# Patient Record
Sex: MALE | Race: WHITE | NOT HISPANIC OR LATINO | Employment: UNEMPLOYED | URBAN - NONMETROPOLITAN AREA
[De-identification: names, ages, dates, MRNs, and addresses within clinical notes are randomized per-mention and may not be internally consistent; named-entity substitution may affect disease eponyms.]

---

## 2021-10-02 ENCOUNTER — HOSPITAL ENCOUNTER (EMERGENCY)
Facility: HOSPITAL | Age: 46
Discharge: HOME | End: 2021-10-03
Attending: EMERGENCY MEDICINE | Admitting: EMERGENCY MEDICINE

## 2021-10-02 VITALS
WEIGHT: 141 LBS | OXYGEN SATURATION: 95 % | HEIGHT: 78 IN | DIASTOLIC BLOOD PRESSURE: 77 MMHG | HEART RATE: 103 BPM | RESPIRATION RATE: 16 BRPM | BODY MASS INDEX: 16.31 KG/M2 | SYSTOLIC BLOOD PRESSURE: 126 MMHG | TEMPERATURE: 97.9 F

## 2021-10-02 DIAGNOSIS — L02.211 ABDOMINAL WALL ABSCESS: Primary | ICD-10-CM

## 2021-10-02 LAB
BASOPHILS # BLD AUTO: 0.04 10*3/MM3 (ref 0–0.2)
BASOPHILS NFR BLD AUTO: 0.5 % (ref 0–1.5)
DEPRECATED RDW RBC AUTO: 46.6 FL (ref 37–54)
EOSINOPHIL # BLD AUTO: 0.09 10*3/MM3 (ref 0–0.4)
EOSINOPHIL NFR BLD AUTO: 1.1 % (ref 0.3–6.2)
ERYTHROCYTE [DISTWIDTH] IN BLOOD BY AUTOMATED COUNT: 13.7 % (ref 12.3–15.4)
HCT VFR BLD AUTO: 29.1 % (ref 37.5–51)
HGB BLD-MCNC: 9.3 G/DL (ref 13–17.7)
IMM GRANULOCYTES # BLD AUTO: 0.03 10*3/MM3 (ref 0–0.05)
IMM GRANULOCYTES NFR BLD AUTO: 0.4 % (ref 0–0.5)
LYMPHOCYTES # BLD AUTO: 1.18 10*3/MM3 (ref 0.7–3.1)
LYMPHOCYTES NFR BLD AUTO: 14 % (ref 19.6–45.3)
MCH RBC QN AUTO: 29.3 PG (ref 26.6–33)
MCHC RBC AUTO-ENTMCNC: 32 G/DL (ref 31.5–35.7)
MCV RBC AUTO: 91.8 FL (ref 79–97)
MONOCYTES # BLD AUTO: 1.12 10*3/MM3 (ref 0.1–0.9)
MONOCYTES NFR BLD AUTO: 13.3 % (ref 5–12)
NEUTROPHILS NFR BLD AUTO: 5.97 10*3/MM3 (ref 1.7–7)
NEUTROPHILS NFR BLD AUTO: 70.7 % (ref 42.7–76)
NRBC BLD AUTO-RTO: 0 /100 WBC (ref 0–0.2)
PLATELET # BLD AUTO: 535 10*3/MM3 (ref 140–450)
PMV BLD AUTO: 9.1 FL (ref 6–12)
RBC # BLD AUTO: 3.17 10*6/MM3 (ref 4.14–5.8)
WBC # BLD AUTO: 8.43 10*3/MM3 (ref 3.4–10.8)

## 2021-10-02 PROCEDURE — 86618 LYME DISEASE ANTIBODY: CPT | Performed by: PHYSICIAN ASSISTANT

## 2021-10-02 PROCEDURE — 86666 EHRLICHIA ANTIBODY: CPT | Performed by: PHYSICIAN ASSISTANT

## 2021-10-02 PROCEDURE — 86753 PROTOZOA ANTIBODY NOS: CPT | Performed by: PHYSICIAN ASSISTANT

## 2021-10-02 PROCEDURE — 80053 COMPREHEN METABOLIC PANEL: CPT | Performed by: PHYSICIAN ASSISTANT

## 2021-10-02 PROCEDURE — 99283 EMERGENCY DEPT VISIT LOW MDM: CPT

## 2021-10-02 PROCEDURE — 86757 RICKETTSIA ANTIBODY: CPT | Performed by: PHYSICIAN ASSISTANT

## 2021-10-02 PROCEDURE — 85025 COMPLETE CBC W/AUTO DIFF WBC: CPT | Performed by: PHYSICIAN ASSISTANT

## 2021-10-02 RX ORDER — BUPRENORPHINE HYDROCHLORIDE AND NALOXONE HYDROCHLORIDE DIHYDRATE 8; 2 MG/1; MG/1
1 TABLET SUBLINGUAL DAILY
COMMUNITY

## 2021-10-02 RX ORDER — LIDOCAINE HYDROCHLORIDE 10 MG/ML
10 INJECTION, SOLUTION INFILTRATION; PERINEURAL ONCE
Status: COMPLETED | OUTPATIENT
Start: 2021-10-02 | End: 2021-10-03

## 2021-10-02 RX ORDER — LISINOPRIL 20 MG/1
20 TABLET ORAL DAILY
COMMUNITY

## 2021-10-03 LAB
ALBUMIN SERPL-MCNC: 3.5 G/DL (ref 3.5–5.2)
ALBUMIN/GLOB SERPL: 0.9 G/DL
ALP SERPL-CCNC: 80 U/L (ref 39–117)
ALT SERPL W P-5'-P-CCNC: 11 U/L (ref 1–41)
ANION GAP SERPL CALCULATED.3IONS-SCNC: 10.8 MMOL/L (ref 5–15)
AST SERPL-CCNC: 18 U/L (ref 1–40)
BILIRUB SERPL-MCNC: 0.2 MG/DL (ref 0–1.2)
BUN SERPL-MCNC: 19 MG/DL (ref 6–20)
BUN/CREAT SERPL: 18.3 (ref 7–25)
CALCIUM SPEC-SCNC: 9.5 MG/DL (ref 8.6–10.5)
CHLORIDE SERPL-SCNC: 100 MMOL/L (ref 98–107)
CO2 SERPL-SCNC: 23.2 MMOL/L (ref 22–29)
CREAT SERPL-MCNC: 1.04 MG/DL (ref 0.76–1.27)
GFR SERPL CREATININE-BSD FRML MDRD: 77 ML/MIN/1.73
GLOBULIN UR ELPH-MCNC: 4 GM/DL
GLUCOSE SERPL-MCNC: 101 MG/DL (ref 65–99)
POTASSIUM SERPL-SCNC: 4.2 MMOL/L (ref 3.5–5.2)
PROT SERPL-MCNC: 7.5 G/DL (ref 6–8.5)
SODIUM SERPL-SCNC: 134 MMOL/L (ref 136–145)

## 2021-10-03 PROCEDURE — 87070 CULTURE OTHR SPECIMN AEROBIC: CPT | Performed by: PHYSICIAN ASSISTANT

## 2021-10-03 PROCEDURE — 25010000003 LIDOCAINE 1 % SOLUTION: Performed by: PHYSICIAN ASSISTANT

## 2021-10-03 PROCEDURE — 87186 SC STD MICRODIL/AGAR DIL: CPT | Performed by: PHYSICIAN ASSISTANT

## 2021-10-03 PROCEDURE — 87147 CULTURE TYPE IMMUNOLOGIC: CPT | Performed by: PHYSICIAN ASSISTANT

## 2021-10-03 PROCEDURE — 87205 SMEAR GRAM STAIN: CPT | Performed by: PHYSICIAN ASSISTANT

## 2021-10-03 RX ADMIN — LIDOCAINE HYDROCHLORIDE 10 ML: 10 INJECTION, SOLUTION INFILTRATION; PERINEURAL at 00:41

## 2021-10-03 NOTE — DISCHARGE INSTRUCTIONS
We will call you with the results of your blood test and a wound culture from your abscesses if another antibiotic is required.  Please follow-up with the surgeon that you were told to in your hometown.  We have also supplied you with the name and number of our local surgeon here who can see you as well.

## 2021-10-03 NOTE — ED PROVIDER NOTES
Subjective   Chief Complaint: Abdominal wall abscesses, fatigue, large area of swelling to left posterior lateral ribs  History of Present Illness: 6-year-old male presents for evaluation of above complaints.  He states he is been on numerous different antibiotics for abscesses to his abdominal wall.  He states he had a CT scan and blood work done at an outlying facility showing a cystic mass to his left wrist or lateral ribs.  He was told to follow-up with general surgery.  Expresses concern because 2 months ago he found 2 ticks on him and is concerned he is developed tickborne illness.  His vitals are stable here.  Onset: 2 months ago  Timing: Ongoing  Exacerbating / Alleviating factors: None  Associated symptoms: None      Nurses Notes reviewed and agree, including vitals, allergies, social history and prior medical history.          Review of Systems   Constitutional: Positive for fatigue.        Subjective fevers   HENT: Negative.    Eyes: Negative.    Respiratory: Negative.    Cardiovascular: Negative.    Gastrointestinal: Negative.    Genitourinary: Negative.    Musculoskeletal:        Joint pain   Skin:        Abscesses to the abdominal wall, cystic mass left posterior lateral ribs   Allergic/Immunologic: Negative.    Neurological: Negative.    Psychiatric/Behavioral: Negative.    All other systems reviewed and are negative.      Past Medical History:   Diagnosis Date   • Hypertension    • PTSD (post-traumatic stress disorder)        No Known Allergies    Past Surgical History:   Procedure Laterality Date   • AMPUTATION     • BACK SURGERY     • KNEE ACL RECONSTRUCTION         History reviewed. No pertinent family history.    Social History     Socioeconomic History   • Marital status: Single     Spouse name: Not on file   • Number of children: Not on file   • Years of education: Not on file   • Highest education level: Not on file   Tobacco Use   • Smoking status: Current Every Day Smoker     Packs/day: 0.00      Types: Cigarettes   • Tobacco comment: 3 cigarretes a day   Substance and Sexual Activity   • Alcohol use: Not Currently   • Drug use: Not Currently           Objective   Physical Exam  Vitals and nursing note reviewed.   Constitutional:       General: He is not in acute distress.     Appearance: Normal appearance. He is not ill-appearing or toxic-appearing.   HENT:      Head: Normocephalic and atraumatic.      Nose: Nose normal.   Eyes:      Extraocular Movements: Extraocular movements intact.   Cardiovascular:      Rate and Rhythm: Normal rate and regular rhythm.   Pulmonary:      Effort: Pulmonary effort is normal.      Breath sounds: Normal breath sounds.   Abdominal:      General: Abdomen is flat.      Palpations: Abdomen is soft.   Musculoskeletal:         General: Normal range of motion.      Cervical back: Normal range of motion.   Skin:     Comments: 2 large abscesses to the right lower abdomen, fluctuant.  No significant overlying cellulitis.    Large cystic appearing mass of the left posterior lateral ribs.  This is about the size of a palm and outstretched hands.,  There is no overlying cellulitis or fluctuance or open wounds   Neurological:      General: No focal deficit present.      Mental Status: He is alert. Mental status is at baseline.   Psychiatric:         Behavior: Behavior normal.         Procedures  Incision and Drainage Procedure1    Incision and Drainage of abscess located to patients right abdomen  size 6 cm x 4 cm  preparation with alcohol wipe  anesthesia achieved with 1% lidocaine without epinephrine  incision with 11 blade scalpel  drained (amount and quality) obese amount of purulent drainage, 2.5 inches of quarter inch gauze packing placed  probed and deloculated (complicated abscess)  dressing: Gauze  patient tolerated procedure well     Incision and Drainage Procedure2    Incision and Drainage of abscess located to patients right lower abdomen  size centimeters by 4  cm  preparation with alcohol wipe  anesthesia achieved with 1% lidocaine without epinephrine  incision with 11 blade scalpel  drained (amount and quality) copious amount of purulent discharge  probed and deloculated (complicated abscess)  dressing: Gauze  patient tolerated procedure well    ED Course      Review of records from [facility] in [facility] showed the patient was seen and evaluated on 9/15/2021.  He had soft tissue abscesses to the abdominal wall and one on his chest anteriorly.  There is a large soft tissue mass noted to the left lower ribs posteriorly as well.  CT scan was obtained that showed the soft tissue mass in the upper back which was most likely cystic and also a soft tissue mass which appeared to be solid along the right rectus muscle.  He was discharged home with Bactrim DS twice a day and given Relafen 500 twice daily for 10 days.  Instructed to follow-up with a local surgeon for outpatient surgery.  Blood work was notable for leukocytosis of 14.79, he was discharged home in good stable condition.  He is yet to follow-up with surgery.  Here to abdominal wall abscesses were incised and drained with successful removal of copious amounts of purulent drainage.  Serologies were obtained for tickborne illnesses.  Culture was obtained for the abscesses to the abdominal wall and will call the patient with antibiotic recommendations while susceptibility testing has returned.  Patient is in no acute distress nontoxic I do feel that the soft tissue mass/swelling to the left posterior lateral ribs will be consistent with a cystic mass not amenable to I&D here in the ER given its size.  He will need outpatient follow-up.                                     MDM    Final diagnoses:   Abdominal wall abscess       ED Disposition  ED Disposition     ED Disposition Condition Comment    Discharge Stable           Candelaria Hoffman MD  [address] [address] [phone]    Schedule an  appointment as soon as possible for a visit            Medication List      No changes were made to your prescriptions during this visit.          Jovani Gtz PA-C  10/03/21 0055       Jovani Gtz PA-C  10/03/21 0115

## 2021-10-04 ENCOUNTER — OFFICE VISIT (OUTPATIENT)
Dept: SURGERY | Facility: CLINIC | Age: 46
End: 2021-10-04

## 2021-10-04 VITALS
OXYGEN SATURATION: 97 % | TEMPERATURE: 98.4 F | BODY MASS INDEX: 16.59 KG/M2 | SYSTOLIC BLOOD PRESSURE: 128 MMHG | DIASTOLIC BLOOD PRESSURE: 78 MMHG | HEIGHT: 78 IN | WEIGHT: 143.4 LBS | HEART RATE: 107 BPM

## 2021-10-04 DIAGNOSIS — L02.212 CUTANEOUS ABSCESS OF BACK EXCLUDING BUTTOCKS: ICD-10-CM

## 2021-10-04 DIAGNOSIS — R22.2 MASS ON BACK: Primary | ICD-10-CM

## 2021-10-04 LAB
B BURGDOR IGG+IGM SER-ACNC: <0.91 ISR (ref 0–0.9)
B BURGDOR IGM SER IA-ACNC: <0.8 INDEX (ref 0–0.79)

## 2021-10-04 PROCEDURE — 10061 I&D ABSCESS COMP/MULTIPLE: CPT | Performed by: SURGERY

## 2021-10-04 PROCEDURE — 99203 OFFICE O/P NEW LOW 30 MIN: CPT | Performed by: SURGERY

## 2021-10-04 NOTE — PROGRESS NOTES
Patient: Chuy Mohr    YOB: 1975    Date: 10/04/2021    Primary Care Provider: Provider, No Known    Reason for Consultation: Lesion    Chief Complaint   Patient presents with   • Cyst     chest and back       Subjective .     History of present illness:  I saw the patient in the office today as a consultation for a cyst on the back and he had an I&D on the abdomen in the ER on 10/2/2021.  Patient has a very large mass on his left middle back that is greater than 20 x 30 cm.  It is fluctuant and very tender and has been present for over 2 months.  Also indicates there is fluid in the knee area consistent with an abscess.    The following portions of the patient's history were reviewed and updated as appropriate: allergies, current medications, past family history, past medical history, past social history, past surgical history and problem list.    Review of Systems   Constitutional: Negative for chills, fever and unexpected weight change.   HENT: Negative for trouble swallowing and voice change.    Eyes: Negative for visual disturbance.   Respiratory: Negative for apnea, cough, chest tightness, shortness of breath and wheezing.    Cardiovascular: Negative for chest pain, palpitations and leg swelling.   Gastrointestinal: Negative for abdominal distention, abdominal pain, anal bleeding, blood in stool, constipation, diarrhea, nausea, rectal pain and vomiting.   Endocrine: Negative for cold intolerance and heat intolerance.   Genitourinary: Negative for difficulty urinating, dysuria, flank pain, scrotal swelling and testicular pain.   Musculoskeletal: Negative for back pain, gait problem and joint swelling.   Skin: Negative for color change, rash and wound.   Neurological: Negative for dizziness, syncope, speech difficulty, weakness, numbness and headaches.   Hematological: Negative for adenopathy. Does not bruise/bleed easily.   Psychiatric/Behavioral: Negative for confusion. The patient is not  "nervous/anxious.        History:  Past Medical History:   Diagnosis Date   • Diabetes mellitus (HCC)    • Hypertension    • PTSD (post-traumatic stress disorder)        Past Surgical History:   Procedure Laterality Date   • AMPUTATION     • BACK SURGERY     • KNEE ACL RECONSTRUCTION         Family History   Problem Relation Age of Onset   • Cancer Mother    • Hypertension Mother    • Heart attack Father    • Hypertension Father    • Diabetes Sister    • Hypertension Sister        Social History     Tobacco Use   • Smoking status: Current Every Day Smoker     Packs/day: 0.00     Types: Cigarettes   • Smokeless tobacco: Current User     Types: Snuff   • Tobacco comment: 3 cigarretes a day   Substance Use Topics   • Alcohol use: Not Currently   • Drug use: Not Currently        Allergies:  No Known Allergies    Medications:    Current Outpatient Medications:   •  buprenorphine-naloxone (SUBOXONE) 8-2 MG per SL tablet, Place 1 tablet under the tongue Daily., Disp: , Rfl:   •  lisinopril (PRINIVIL,ZESTRIL) 20 MG tablet, Take 20 mg by mouth Daily., Disp: , Rfl:     Objective     Vital Signs:   Vitals:    10/04/21 1348   BP: 128/78   Pulse: 107   Temp: 98.4 °F (36.9 °C)   TempSrc: Temporal   SpO2: 97%   Weight: 65 kg (143 lb 6.4 oz)   Height: 198.1 cm (78\")       Physical Exam:  Lungs:     Clear to auscultation,respirations regular, even and                  unlabored    Heart:    Regular rhythm and normal rate, normal S1 and S2, no            murmur      General Appearance:    Alert, cooperative, in no acute distress   Head:    Normocephalic, without obvious abnormality, atraumatic   Eyes:            Lids and lashes normal, conjunctivae and sclerae normal, no   icterus, no pallor, corneas clear.   Ears:    Ears appear intact with no abnormalities noted   Throat:   No oral lesions, no thrush, oral mucosa moist   Neck:   No adenopathy, supple, trachea midline, no thyromegaly, no   carotid bruit.   Extremities:   Moves all " extremities well, no edema, no cyanosis, no             Redness.    Pulses:   Pulses palpable and equal bilaterally   Skin:   No bleeding, bruising or rash. Lesion left middle back, greater than 30 cm.  Fluctuant very tender.   Lymph nodes:   No palpable adenopathy   Neurologic:   Cranial nerves 2 - 12 grossly intact, sensation intact.     Results Review:   I reviewed the patient's new clinical results.    Review of Systems was reviewed and confirmed as accurate as documented by the MA.    Assessment/Plan     1. Mass on back    2. Cutaneous abscess of back excluding buttocks        Patient requires incision drainage of large abscess on his back.    Procedure: Incision and drainage complex abscess left lower mid back with packing    I recommended abscess drainage to the patient. I explained the indication as well as the risks and benefits which include bleeding, further infection requiring additional procedures, non healing of the wound etc. The patient understands these and wishes to proceed.      The patient was brought to the procedure room. Consent and time out were performed. The area was prepped and draped in the usual fashion. 1% lidocaine with epinephrine was infused locally. The abscess was then incised and drained sharply with a #11 blade. Purulent contents were evacuated and irrigated with saline and peroxide.  There was at least 32 ounces of purulent fluid removed.  Packing placed.  Minimal blood loss had occurred and was well controlled with pressure. There were no complications and the patient tolerated the procedure well. Wound instructions were given.     I discussed the patients findings and my recommendations with patient and consulting provider.    Electronically signed by Candelaria Hoffman MD  10/04/21  15:31 EDT

## 2021-10-05 ENCOUNTER — TELEPHONE (OUTPATIENT)
Dept: SURGERY | Facility: CLINIC | Age: 46
End: 2021-10-05

## 2021-10-05 ENCOUNTER — TELEPHONE (OUTPATIENT)
Dept: EMERGENCY DEPT | Facility: HOSPITAL | Age: 46
End: 2021-10-05

## 2021-10-05 LAB
A PHAGOCYTOPH IGG TITR SER IF: NEGATIVE {TITER}
A PHAGOCYTOPH IGM TITR SER IF: NEGATIVE {TITER}
BABESIA IGG TITR SER IF: NORMAL {TITER}
BABESIA IGM TITR SER IF: NORMAL {TITER}
BACTERIA SPEC AEROBE CULT: ABNORMAL
E CHAFFEENSIS IGG TITR SER IF: NEGATIVE {TITER}
E CHAFFEENSIS IGM TITR SER IF: NEGATIVE {TITER}
GRAM STN SPEC: ABNORMAL
GRAM STN SPEC: ABNORMAL
R RICKETTSI IGG SER QL IA: NEGATIVE
R RICKETTSI IGM SER-ACNC: 0.2 INDEX (ref 0–0.89)

## 2021-10-05 RX ORDER — TRAMADOL HYDROCHLORIDE 50 MG/1
50 TABLET ORAL EVERY 6 HOURS PRN
Qty: 20 TABLET | Refills: 0 | Status: SHIPPED | OUTPATIENT
Start: 2021-10-05 | End: 2022-10-05

## 2021-10-06 ENCOUNTER — OFFICE VISIT (OUTPATIENT)
Dept: SURGERY | Facility: CLINIC | Age: 46
End: 2021-10-06

## 2021-10-06 VITALS
WEIGHT: 143.3 LBS | HEART RATE: 117 BPM | HEIGHT: 78 IN | DIASTOLIC BLOOD PRESSURE: 70 MMHG | TEMPERATURE: 98.7 F | BODY MASS INDEX: 16.58 KG/M2 | SYSTOLIC BLOOD PRESSURE: 124 MMHG | OXYGEN SATURATION: 94 %

## 2021-10-06 DIAGNOSIS — Z48.89 POSTOPERATIVE VISIT: Primary | ICD-10-CM

## 2021-10-06 DIAGNOSIS — L02.212 CUTANEOUS ABSCESS OF BACK EXCLUDING BUTTOCKS: ICD-10-CM

## 2021-10-06 PROCEDURE — 10061 I&D ABSCESS COMP/MULTIPLE: CPT | Performed by: SURGERY

## 2021-10-06 PROCEDURE — 99024 POSTOP FOLLOW-UP VISIT: CPT | Performed by: SURGERY

## 2021-10-06 RX ORDER — DOXYCYCLINE 100 MG/1
100 CAPSULE ORAL 2 TIMES DAILY
Qty: 14 CAPSULE | Refills: 0 | Status: SHIPPED | OUTPATIENT
Start: 2021-10-06 | End: 2021-10-13

## 2021-10-06 NOTE — PROGRESS NOTES
"Patient: Chuy Mohr  YOB: 1975    Date: 10/06/2021    Primary Care Provider: Provider, No Known    Chief Complaint   Patient presents with   • Post-op Follow-up     left side abscess       History: The patient is in the office today for a follow up after I&D of a large abscess on his left side.  He complains of pain and fever.  Patient has persistent drainage of abscess in the left upper back.    The following portions of the patient's history were reviewed and updated as appropriate: allergies, current medications, past family history, past medical history, past social history, past surgical history and problem list.    Review of Systems   Constitutional: Negative for chills, fever and unexpected weight change.   HENT: Negative for trouble swallowing and voice change.    Eyes: Negative for visual disturbance.   Respiratory: Negative for apnea, cough, chest tightness, shortness of breath and wheezing.    Cardiovascular: Negative for chest pain, palpitations and leg swelling.   Gastrointestinal: Negative for abdominal distention, abdominal pain, anal bleeding, blood in stool, constipation, diarrhea, nausea, rectal pain and vomiting.   Endocrine: Negative for cold intolerance and heat intolerance.   Genitourinary: Negative for difficulty urinating, dysuria, flank pain, scrotal swelling and testicular pain.   Musculoskeletal: Negative for back pain, gait problem and joint swelling.   Skin: Negative for color change, rash and wound.   Neurological: Negative for dizziness, syncope, speech difficulty, weakness, numbness and headaches.   Hematological: Negative for adenopathy. Does not bruise/bleed easily.   Psychiatric/Behavioral: Negative for confusion. The patient is not nervous/anxious.        Vital Signs  Vitals:    10/06/21 0946   BP: 124/70   Pulse: 117   Temp: 98.7 °F (37.1 °C)   TempSrc: Temporal   SpO2: 94%   Weight: 65 kg (143 lb 4.8 oz)   Height: 198.1 cm (77.99\")       Allergies:  No Known " Allergies    Medications:    Current Outpatient Medications:   •  buprenorphine-naloxone (SUBOXONE) 8-2 MG per SL tablet, Place 1 tablet under the tongue Daily., Disp: , Rfl:   •  lisinopril (PRINIVIL,ZESTRIL) 20 MG tablet, Take 20 mg by mouth Daily., Disp: , Rfl:   •  traMADol (Ultram) 50 MG tablet, Take 1 tablet by mouth Every 6 (Six) Hours As Needed for Severe Pain ., Disp: 20 tablet, Rfl: 0    Physical Exam:   General Appearance:    Alert, cooperative, in no acute distress   Head:    Normocephalic, without obvious abnormality, atraumatic   Lungs:     Clear to auscultation,respirations regular, even and                  unlabored    Heart:    Regular rhythm and normal rate, normal S1 and S2, no            murmur, no gallop, no rub, no click   Abdomen:     Normal bowel sounds, no masses, no organomegaly, soft        non-tender, non-distended, no guarding, no rebound                tenderness   Extremities:   Moves all extremities well, no edema, no cyanosis, no             redness   Pulses:   Pulses palpable and equal bilaterally   Skin:   No bleeding, bruising or rash.  Persistent absent left upper quadrant.     Results Review:   I reviewed the patient's new clinical results.     Review of Systems was reviewed and confirmed as accurate as documented by the MA.    ASSESSMENT/PLAN:    1. Postoperative visit    2. Cutaneous abscess of back excluding buttocks       Procedure: Incision and drainage complex deep abscess left upper back findings    I recommended abscess drainage to the patient. I explained the indication as well as the risks and benefits which include bleeding, further infection requiring additional procedures, non healing of the wound etc. The patient understands these and wishes to proceed.      The patient was brought to the procedure room. Consent and time out were performed. The area was prepped and draped in the usual fashion. 1% lidocaine with epinephrine was infused locally. The abscess was then  incised and drained sharply with a #11 blade. Purulent contents were evacuated and irrigated with saline and peroxide. Minimal blood loss had occurred and was well controlled with pressure. There were no complications and the patient tolerated the procedure well. Wound instructions were given.    Electronically signed by Candelaria Hoffman MD  10/06/21    Portions of this note have been scribed for Candelaria Hoffman MD by Michelle Milligan. 10/6/2021  10:04 EDT

## 2021-10-12 NOTE — PROGRESS NOTES
"Patient: Chuy Mohr    YOB: 1975    Date: 10/13/2021    Primary Care Provider: Provider, No Known    Chief Complaint   Patient presents with   • Follow-up     I & D       History of present illness:  I saw the patient in the office today as a followup from their recent incision and drainage of a left side abscess.  They state that they have done well and are having no problems.    Vital Signs:  Vitals:    10/13/21 1011   BP: 118/64   Pulse: 97   Resp: 18   Temp: 98.2 °F (36.8 °C)   TempSrc: Temporal   SpO2: 100%   Weight: 64 kg (141 lb 3.2 oz)   Height: 198.1 cm (77.99\")       Physical Exam:   General Appearance:    Alert, cooperative, in no acute distress, wound clean dry without infection   Abdomen:     no masses, no organomegaly, soft non-tender, non-distended, no guarding, wounds are well healed   Chest:      Clear to ausculation       Assessment / Plan:    1. Postoperative visit        I did discuss the situation with the patient today in the office and they have done well from their recent lesion excision, I don't think that the patient needs any further intervention and I need to see them back only if they have further problems. Pathology report was reviewed with the patient in the office.  RAY drain removed.  Follow-up as needed.    Electronically signed by Candelaria Hoffman MD  10/13/21                      "

## 2021-10-13 ENCOUNTER — OFFICE VISIT (OUTPATIENT)
Dept: SURGERY | Facility: CLINIC | Age: 46
End: 2021-10-13

## 2021-10-13 VITALS
HEART RATE: 97 BPM | OXYGEN SATURATION: 100 % | HEIGHT: 78 IN | DIASTOLIC BLOOD PRESSURE: 64 MMHG | BODY MASS INDEX: 16.34 KG/M2 | RESPIRATION RATE: 18 BRPM | SYSTOLIC BLOOD PRESSURE: 118 MMHG | WEIGHT: 141.2 LBS | TEMPERATURE: 98.2 F

## 2021-10-13 DIAGNOSIS — Z48.89 POSTOPERATIVE VISIT: Primary | ICD-10-CM

## 2021-10-13 DIAGNOSIS — L02.212 CUTANEOUS ABSCESS OF BACK EXCLUDING BUTTOCKS: Primary | ICD-10-CM

## 2021-10-13 PROCEDURE — 99024 POSTOP FOLLOW-UP VISIT: CPT | Performed by: SURGERY

## 2024-04-12 ENCOUNTER — PRE-ADMISSION TESTING (OUTPATIENT)
Dept: PREADMISSION TESTING | Facility: HOSPITAL | Age: 49
End: 2024-04-12
Payer: MEDICARE

## 2024-04-12 LAB
ANION GAP SERPL CALCULATED.3IONS-SCNC: 10.8 MMOL/L (ref 5–15)
BUN SERPL-MCNC: 15 MG/DL (ref 6–20)
BUN/CREAT SERPL: 22.7 (ref 7–25)
CALCIUM SPEC-SCNC: 9.5 MG/DL (ref 8.6–10.5)
CHLORIDE SERPL-SCNC: 100 MMOL/L (ref 98–107)
CO2 SERPL-SCNC: 28.2 MMOL/L (ref 22–29)
CREAT SERPL-MCNC: 0.66 MG/DL (ref 0.76–1.27)
DEPRECATED RDW RBC AUTO: 50.9 FL (ref 37–54)
EGFRCR SERPLBLD CKD-EPI 2021: 115 ML/MIN/1.73
ERYTHROCYTE [DISTWIDTH] IN BLOOD BY AUTOMATED COUNT: 14 % (ref 12.3–15.4)
GLUCOSE SERPL-MCNC: 98 MG/DL (ref 65–99)
HCT VFR BLD AUTO: 40.2 % (ref 37.5–51)
HGB BLD-MCNC: 12.9 G/DL (ref 13–17.7)
MCH RBC QN AUTO: 31.6 PG (ref 26.6–33)
MCHC RBC AUTO-ENTMCNC: 32.1 G/DL (ref 31.5–35.7)
MCV RBC AUTO: 98.5 FL (ref 79–97)
PLATELET # BLD AUTO: 354 10*3/MM3 (ref 140–450)
PMV BLD AUTO: 10.3 FL (ref 6–12)
POTASSIUM SERPL-SCNC: 4 MMOL/L (ref 3.5–5.2)
RBC # BLD AUTO: 4.08 10*6/MM3 (ref 4.14–5.8)
SODIUM SERPL-SCNC: 139 MMOL/L (ref 136–145)
WBC NRBC COR # BLD AUTO: 10.82 10*3/MM3 (ref 3.4–10.8)

## 2024-04-12 PROCEDURE — 85027 COMPLETE CBC AUTOMATED: CPT

## 2024-04-12 PROCEDURE — 36415 COLL VENOUS BLD VENIPUNCTURE: CPT

## 2024-04-12 PROCEDURE — 80048 BASIC METABOLIC PNL TOTAL CA: CPT

## 2024-04-12 RX ORDER — GABAPENTIN 300 MG/1
300 CAPSULE ORAL 3 TIMES DAILY
COMMUNITY

## 2024-04-12 RX ORDER — QUETIAPINE 400 MG/1
400 TABLET, FILM COATED, EXTENDED RELEASE ORAL NIGHTLY
COMMUNITY

## 2024-04-12 RX ORDER — INDOMETHACIN 75 MG/1
75 CAPSULE, EXTENDED RELEASE ORAL 2 TIMES DAILY WITH MEALS
COMMUNITY

## 2024-04-12 RX ORDER — TIZANIDINE 4 MG/1
4 TABLET ORAL EVERY 8 HOURS PRN
COMMUNITY

## 2024-04-12 RX ORDER — PRAVASTATIN SODIUM 10 MG
10 TABLET ORAL NIGHTLY
COMMUNITY

## 2024-04-12 RX ORDER — CEPHALEXIN 250 MG/1
250 CAPSULE ORAL 2 TIMES DAILY
COMMUNITY

## 2024-04-12 NOTE — DISCHARGE INSTRUCTIONS

## 2024-04-15 ENCOUNTER — APPOINTMENT (OUTPATIENT)
Dept: GENERAL RADIOLOGY | Facility: HOSPITAL | Age: 49
End: 2024-04-15
Payer: MEDICARE

## 2024-04-15 ENCOUNTER — ANESTHESIA (OUTPATIENT)
Dept: PERIOP | Facility: HOSPITAL | Age: 49
End: 2024-04-15
Payer: MEDICARE

## 2024-04-15 ENCOUNTER — HOSPITAL ENCOUNTER (OUTPATIENT)
Facility: HOSPITAL | Age: 49
Setting detail: HOSPITAL OUTPATIENT SURGERY
Discharge: HOME | End: 2024-04-15
Attending: GENERAL PRACTICE | Admitting: GENERAL PRACTICE
Payer: MEDICARE

## 2024-04-15 ENCOUNTER — ANESTHESIA EVENT (OUTPATIENT)
Dept: PERIOP | Facility: HOSPITAL | Age: 49
End: 2024-04-15
Payer: MEDICARE

## 2024-04-15 VITALS
SYSTOLIC BLOOD PRESSURE: 135 MMHG | OXYGEN SATURATION: 98 % | DIASTOLIC BLOOD PRESSURE: 97 MMHG | WEIGHT: 172 LBS | BODY MASS INDEX: 19.9 KG/M2 | HEART RATE: 68 BPM | HEIGHT: 78 IN | TEMPERATURE: 97.9 F | RESPIRATION RATE: 16 BRPM

## 2024-04-15 DIAGNOSIS — Z98.890 H/O KNEE SURGERY: Primary | ICD-10-CM

## 2024-04-15 PROCEDURE — 25010000002 PROPOFOL 200 MG/20ML EMULSION: Performed by: NURSE ANESTHETIST, CERTIFIED REGISTERED

## 2024-04-15 PROCEDURE — 25010000002 FENTANYL CITRATE (PF) 50 MCG/ML SOLUTION: Performed by: NURSE ANESTHETIST, CERTIFIED REGISTERED

## 2024-04-15 PROCEDURE — 25010000002 KETOROLAC TROMETHAMINE PER 15 MG: Performed by: NURSE ANESTHETIST, CERTIFIED REGISTERED

## 2024-04-15 PROCEDURE — 25010000002 CEFAZOLIN PER 500 MG: Performed by: GENERAL PRACTICE

## 2024-04-15 PROCEDURE — 25010000002 BUPIVACAINE (PF) 0.25 % SOLUTION 10 ML VIAL: Performed by: GENERAL PRACTICE

## 2024-04-15 PROCEDURE — 25810000003 LACTATED RINGERS PER 1000 ML: Performed by: NURSE ANESTHETIST, CERTIFIED REGISTERED

## 2024-04-15 PROCEDURE — 25810000003 LACTATED RINGERS PER 1000 ML: Performed by: ANESTHESIOLOGY

## 2024-04-15 PROCEDURE — 25010000002 MIDAZOLAM PER 1 MG: Performed by: NURSE ANESTHETIST, CERTIFIED REGISTERED

## 2024-04-15 PROCEDURE — 25010000002 ONDANSETRON PER 1 MG: Performed by: NURSE ANESTHETIST, CERTIFIED REGISTERED

## 2024-04-15 PROCEDURE — 25010000002 LIDOCAINE 1 % SOLUTION 20 ML VIAL: Performed by: GENERAL PRACTICE

## 2024-04-15 PROCEDURE — 25010000002 MEPERIDINE PER 100 MG: Performed by: NURSE ANESTHETIST, CERTIFIED REGISTERED

## 2024-04-15 RX ORDER — OXYCODONE HYDROCHLORIDE 5 MG/1
5 TABLET ORAL EVERY 6 HOURS PRN
Qty: 30 TABLET | Refills: 0 | Status: SHIPPED | OUTPATIENT
Start: 2024-04-15

## 2024-04-15 RX ORDER — MEPERIDINE HYDROCHLORIDE 25 MG/ML
12.5 INJECTION INTRAMUSCULAR; INTRAVENOUS; SUBCUTANEOUS
Status: COMPLETED | OUTPATIENT
Start: 2024-04-15 | End: 2024-04-15

## 2024-04-15 RX ORDER — MIDAZOLAM HYDROCHLORIDE 1 MG/ML
1 INJECTION INTRAMUSCULAR; INTRAVENOUS
Status: DISCONTINUED | OUTPATIENT
Start: 2024-04-15 | End: 2024-04-15 | Stop reason: HOSPADM

## 2024-04-15 RX ORDER — ONDANSETRON 2 MG/ML
4 INJECTION INTRAMUSCULAR; INTRAVENOUS AS NEEDED
Status: DISCONTINUED | OUTPATIENT
Start: 2024-04-15 | End: 2024-04-15 | Stop reason: HOSPADM

## 2024-04-15 RX ORDER — SODIUM CHLORIDE 0.9 % (FLUSH) 0.9 %
10 SYRINGE (ML) INJECTION EVERY 12 HOURS SCHEDULED
Status: DISCONTINUED | OUTPATIENT
Start: 2024-04-15 | End: 2024-04-15 | Stop reason: HOSPADM

## 2024-04-15 RX ORDER — SODIUM CHLORIDE, SODIUM LACTATE, POTASSIUM CHLORIDE, CALCIUM CHLORIDE 600; 310; 30; 20 MG/100ML; MG/100ML; MG/100ML; MG/100ML
100 INJECTION, SOLUTION INTRAVENOUS ONCE AS NEEDED
Status: DISCONTINUED | OUTPATIENT
Start: 2024-04-15 | End: 2024-04-15 | Stop reason: HOSPADM

## 2024-04-15 RX ORDER — OXYCODONE HYDROCHLORIDE AND ACETAMINOPHEN 5; 325 MG/1; MG/1
1 TABLET ORAL ONCE AS NEEDED
Status: DISCONTINUED | OUTPATIENT
Start: 2024-04-15 | End: 2024-04-15 | Stop reason: HOSPADM

## 2024-04-15 RX ORDER — SODIUM CHLORIDE, SODIUM LACTATE, POTASSIUM CHLORIDE, CALCIUM CHLORIDE 600; 310; 30; 20 MG/100ML; MG/100ML; MG/100ML; MG/100ML
125 INJECTION, SOLUTION INTRAVENOUS ONCE
Status: COMPLETED | OUTPATIENT
Start: 2024-04-15 | End: 2024-04-15

## 2024-04-15 RX ORDER — MAGNESIUM HYDROXIDE 1200 MG/15ML
LIQUID ORAL AS NEEDED
Status: DISCONTINUED | OUTPATIENT
Start: 2024-04-15 | End: 2024-04-15 | Stop reason: HOSPADM

## 2024-04-15 RX ORDER — FAMOTIDINE 10 MG/ML
INJECTION, SOLUTION INTRAVENOUS AS NEEDED
Status: DISCONTINUED | OUTPATIENT
Start: 2024-04-15 | End: 2024-04-15 | Stop reason: SURG

## 2024-04-15 RX ORDER — KETOROLAC TROMETHAMINE 30 MG/ML
INJECTION, SOLUTION INTRAMUSCULAR; INTRAVENOUS AS NEEDED
Status: DISCONTINUED | OUTPATIENT
Start: 2024-04-15 | End: 2024-04-15 | Stop reason: SURG

## 2024-04-15 RX ORDER — PROPOFOL 10 MG/ML
INJECTION, EMULSION INTRAVENOUS AS NEEDED
Status: DISCONTINUED | OUTPATIENT
Start: 2024-04-15 | End: 2024-04-15 | Stop reason: SURG

## 2024-04-15 RX ORDER — CELECOXIB 200 MG/1
200 CAPSULE ORAL DAILY
Qty: 14 CAPSULE | Refills: 0 | Status: SHIPPED | OUTPATIENT
Start: 2024-04-15

## 2024-04-15 RX ORDER — SODIUM CHLORIDE, SODIUM LACTATE, POTASSIUM CHLORIDE, CALCIUM CHLORIDE 600; 310; 30; 20 MG/100ML; MG/100ML; MG/100ML; MG/100ML
INJECTION, SOLUTION INTRAVENOUS CONTINUOUS PRN
Status: DISCONTINUED | OUTPATIENT
Start: 2024-04-15 | End: 2024-04-15 | Stop reason: SURG

## 2024-04-15 RX ORDER — ONDANSETRON 2 MG/ML
INJECTION INTRAMUSCULAR; INTRAVENOUS AS NEEDED
Status: DISCONTINUED | OUTPATIENT
Start: 2024-04-15 | End: 2024-04-15 | Stop reason: SURG

## 2024-04-15 RX ORDER — SODIUM CHLORIDE 0.9 % (FLUSH) 0.9 %
10 SYRINGE (ML) INJECTION AS NEEDED
Status: DISCONTINUED | OUTPATIENT
Start: 2024-04-15 | End: 2024-04-15 | Stop reason: HOSPADM

## 2024-04-15 RX ORDER — LIDOCAINE HYDROCHLORIDE 20 MG/ML
INJECTION, SOLUTION EPIDURAL; INFILTRATION; INTRACAUDAL; PERINEURAL AS NEEDED
Status: DISCONTINUED | OUTPATIENT
Start: 2024-04-15 | End: 2024-04-15 | Stop reason: SURG

## 2024-04-15 RX ORDER — SODIUM CHLORIDE 9 MG/ML
40 INJECTION, SOLUTION INTRAVENOUS AS NEEDED
Status: DISCONTINUED | OUTPATIENT
Start: 2024-04-15 | End: 2024-04-15 | Stop reason: HOSPADM

## 2024-04-15 RX ORDER — MIDAZOLAM HYDROCHLORIDE 1 MG/ML
INJECTION INTRAMUSCULAR; INTRAVENOUS AS NEEDED
Status: DISCONTINUED | OUTPATIENT
Start: 2024-04-15 | End: 2024-04-15 | Stop reason: SURG

## 2024-04-15 RX ORDER — FENTANYL CITRATE 50 UG/ML
50 INJECTION, SOLUTION INTRAMUSCULAR; INTRAVENOUS
Status: DISCONTINUED | OUTPATIENT
Start: 2024-04-15 | End: 2024-04-15 | Stop reason: HOSPADM

## 2024-04-15 RX ORDER — ONDANSETRON 4 MG/1
4 TABLET, FILM COATED ORAL EVERY 8 HOURS PRN
Qty: 20 TABLET | Refills: 0 | Status: SHIPPED | OUTPATIENT
Start: 2024-04-15

## 2024-04-15 RX ORDER — IPRATROPIUM BROMIDE AND ALBUTEROL SULFATE 2.5; .5 MG/3ML; MG/3ML
3 SOLUTION RESPIRATORY (INHALATION) ONCE AS NEEDED
Status: DISCONTINUED | OUTPATIENT
Start: 2024-04-15 | End: 2024-04-15 | Stop reason: HOSPADM

## 2024-04-15 RX ORDER — FENTANYL CITRATE 50 UG/ML
INJECTION, SOLUTION INTRAMUSCULAR; INTRAVENOUS AS NEEDED
Status: DISCONTINUED | OUTPATIENT
Start: 2024-04-15 | End: 2024-04-15 | Stop reason: SURG

## 2024-04-15 RX ADMIN — FENTANYL CITRATE 50 MCG: 50 INJECTION, SOLUTION INTRAMUSCULAR; INTRAVENOUS at 10:45

## 2024-04-15 RX ADMIN — PROPOFOL 200 MG: 10 INJECTION, EMULSION INTRAVENOUS at 09:37

## 2024-04-15 RX ADMIN — FAMOTIDINE 20 MG: 10 INJECTION, SOLUTION INTRAVENOUS at 09:32

## 2024-04-15 RX ADMIN — ONDANSETRON 4 MG: 2 INJECTION INTRAMUSCULAR; INTRAVENOUS at 09:32

## 2024-04-15 RX ADMIN — LIDOCAINE HYDROCHLORIDE 60 MG: 20 INJECTION, SOLUTION EPIDURAL; INFILTRATION; INTRACAUDAL; PERINEURAL at 09:37

## 2024-04-15 RX ADMIN — FENTANYL CITRATE 100 MCG: 50 INJECTION INTRAMUSCULAR; INTRAVENOUS at 09:32

## 2024-04-15 RX ADMIN — MEPERIDINE HYDROCHLORIDE 12.5 MG: 25 INJECTION INTRAMUSCULAR; INTRAVENOUS; SUBCUTANEOUS at 10:50

## 2024-04-15 RX ADMIN — MEPERIDINE HYDROCHLORIDE 12.5 MG: 25 INJECTION INTRAMUSCULAR; INTRAVENOUS; SUBCUTANEOUS at 10:45

## 2024-04-15 RX ADMIN — SODIUM CHLORIDE, POTASSIUM CHLORIDE, SODIUM LACTATE AND CALCIUM CHLORIDE: 600; 310; 30; 20 INJECTION, SOLUTION INTRAVENOUS at 09:32

## 2024-04-15 RX ADMIN — SODIUM CHLORIDE, POTASSIUM CHLORIDE, SODIUM LACTATE AND CALCIUM CHLORIDE 125 ML/HR: 600; 310; 30; 20 INJECTION, SOLUTION INTRAVENOUS at 09:24

## 2024-04-15 RX ADMIN — KETOROLAC TROMETHAMINE 30 MG: 30 INJECTION, SOLUTION INTRAMUSCULAR at 10:13

## 2024-04-15 RX ADMIN — MIDAZOLAM HYDROCHLORIDE 2 MG: 1 INJECTION, SOLUTION INTRAMUSCULAR; INTRAVENOUS at 09:32

## 2024-04-15 RX ADMIN — CEFAZOLIN 2000 MG: 2 INJECTION, POWDER, FOR SOLUTION INTRAMUSCULAR; INTRAVENOUS at 09:32

## 2024-04-15 NOTE — ANESTHESIA POSTPROCEDURE EVALUATION
Patient: Chuy Mohr    Procedure Summary       Date: 04/15/24 Room / Location: [facility] OR 03 /  [facility] OR    Anesthesia Start: 0932 Anesthesia Stop: 1021    Procedure: KNEE ARTHROSCOPIC MEDIAL MENISCECTOMY DEBRIDEMENT (Right: Knee) Diagnosis: (S83.241D)    Surgeons: Andrés Marshall MD Provider: Neal Garcia MD    Anesthesia Type: general ASA Status: 3            Anesthesia Type: general    Vitals  Vitals Value Taken Time   /95 04/15/24 1055   Temp 97.4 °F (36.3 °C) 04/15/24 1025   Pulse 70 04/15/24 1059   Resp 16 04/15/24 1055   SpO2 98 % 04/15/24 1059   Vitals shown include unfiled device data.        Post Anesthesia Care and Evaluation    Patient location during evaluation: PACU  Patient participation: complete - patient participated  Level of consciousness: awake  Pain score: 0  Pain management: satisfactory to patient    Airway patency: patent  Anesthetic complications: No anesthetic complications  PONV Status: none  Cardiovascular status: hemodynamically stable  Respiratory status: nasal cannula  Hydration status: acceptable

## 2024-04-15 NOTE — ANESTHESIA PROCEDURE NOTES
Airway  Urgency: elective    Date/Time: 4/15/2024 9:38 AM    General Information and Staff    Patient location during procedure: OR  CRNA/CAA: Cristine Pickett CRNA    Indications and Patient Condition    Preoxygenated: yes  Mask difficulty assessment: 0 - not attempted    Final Airway Details  Final airway type: supraglottic airway      Successful airway: unique  Size 4     Number of attempts at approach: 1  Assessment: lips, teeth, and gum same as pre-op and atraumatic intubation

## 2024-04-15 NOTE — ANESTHESIA PREPROCEDURE EVALUATION
Anesthesia Evaluation     Patient summary reviewed and Nursing notes reviewed   no history of anesthetic complications:   NPO Solid Status: > 8 hours  NPO Liquid Status: > 8 hours           Airway   Mallampati: II  TM distance: >3 FB  Neck ROM: full  Dental    (+) lower dentures and upper dentures    Pulmonary     breath sounds clear to auscultation  (+) ,sleep apnea on CPAP  Cardiovascular   Exercise tolerance: good (4-7 METS)    Rhythm: regular  Rate: normal    (+) hypertension, hyperlipidemia      Neuro/Psych  (+) psychiatric history PTSD  GI/Hepatic/Renal/Endo - negative ROS     Musculoskeletal     Abdominal     Abdomen: soft.   Substance History - negative use  (-) drug use     OB/GYN          Other   arthritis,     ROS/Med Hx Other: Suboxone last 4/11.                Anesthesia Plan    ASA 3     general     intravenous induction     Anesthetic plan, risks, benefits, and alternatives have been provided, discussed and informed consent has been obtained with: patient.  Pre-procedure education provided  Use of blood products discussed with  Consented to blood products.    Plan discussed with CRNA.    CODE STATUS:

## 2024-04-15 NOTE — OP NOTE
Chuy Mohr  4/15/2024      Operative Note:    Surgeon: Andrés Marshall MD    Assistant: PARAG Perez    Preoperative Diagnosis:   Right knee medial meniscus tear, complex  Right knee pseudogout    Postoperative Diagnosis:   Right knee medial meniscus tear, posterior to mid body, complex  Right knee pseudogout  Right knee medial femoral condyle chondromalacia, grade 3  Right knee lateral meniscus tear, mid body, complex  No ACL, PCL tears    Procedure(s):    1.  Right knee arthroscopic debridement, CPT code 05930    Anesthesia: General with local block    Estimated Blood Loss: 0 cc    Specimen Obtained:  None    Complication(s):  None apparent.     Implants: None.    Operative Indication:     The patient is a 48-year-old with right knee pain despite nonsurgical measures.  Due to his continued pain he elected to proceed with the above operation.  Risk benefits alternatives and complications was discussed with the patient prior to surgery.    Arthroscopic Findings:    There is a large effusion with gouty crystals noted throughout the knee.  There is chondromalacia and a chondral defect of the medial femoral condyle, grade 3.  No other chondral defects noted.  ACL PCL is intact.  There is a degenerative meniscus tear in the mid body of the lateral meniscus.  There is a degenerative meniscus tear noted and complex in nature from the medial/mid body to the posterior horn of the medial meniscus.  No root tear identified.    Operative Details:    The patient was met in the preoperative suite where the correct operative site was marked. The patient was brought to the operating room where anesthesia was initiated. The patient was positioned appropriately with all bony prominences well-padded. The patient was prepped and draped in the usual sterile fashion and prior to incision a time out was observed to verify the correct operative site, procedure and antibiotics.    Anterior lateral portal was created and arthroscope was  introduced in the glenohumeral joint and a thorough diagnostic arthroscopy was performed with findings noted above.    Anterior medial portal was created and the anterior fat pad was debrided for better visualization.  A shaver was utilized to evacuate the effusion in the suprapatella pouch as well as in the intercondylar notch and anterior knee area.  Attention was brought to the medial aspect of the knee where shaver as well as electrocautery was utilized to debride the medial femoral condyle due to the loose hyaline cartilage that was noted.    Due to the arthritic changes noted within the medial compartment as well as the complex nature of the meniscus injury no meniscal repair was performed however a straight biter and a shaver was utilized to debride the meniscus from the mid body to the posterior horn.  No peripheral detachment of the capsule was noted.    Attention was then brought to the lateral compartment of the knee where with probing it was identified a degenerative undersurface meniscus tear at the mid body.  Appeared to be a horizontal split.  This was full-thickness.  A straight biter and a shaver was utilized to debride the undersurface tear keeping intact the superior remanent of the meniscus that was intact anteriorly to posteriorly.    The lateral meniscus had a stable probing with no displacement noted.    Portal sites were closed with nylon suture 1 from removing the instruments from the knee.  An intra-articular injection was then performed for postoperative pain control.  A soft dressing was applied.  Patient was extubated, transfer hospital bed in the PACU in stable condition.  Patient tolerated procedure well without any complications.         Postoperative Plan:    Discharge to home today  Dressing-maintain dressing until 3 to 4 days  Weight Bear/Lifting Status-as tolerated  DVT PPx-aspirin 81 mg twice daily for 14 days  Follow up-2 weeks in the office    Electronically Signed by: Andrés  MD Rolando

## (undated) DEVICE — DRSNG WND GZ CURAD OIL EMULSION 3X8IN LF STRL 1PK

## (undated) DEVICE — PAD LEG HLDR

## (undated) DEVICE — PAD REPL POST SURG TOTL KN

## (undated) DEVICE — GLV SURG SENSICARE W/ALOE PF LF 8.5 STRL

## (undated) DEVICE — BANDAGE,ELASTIC,ESMARK,STERILE,6"X9',LF: Brand: MEDLINE

## (undated) DEVICE — DISPOSABLE TOURNIQUET CUFF SINGLE BLADDER, SINGLE PORT AND LUER LOCK CONNECTOR: Brand: COLOR CUFF

## (undated) DEVICE — NDL,TUOHY EPID,17GX3.5",PLASTIC STYLET: Brand: MEDLINE

## (undated) DEVICE — MAT FLR ABSORBENT LG 4FT 10 2.5FT

## (undated) DEVICE — BNDG,ELSTC,MATRIX,STRL,6"X5YD,LF,HOOK&LP: Brand: MEDLINE

## (undated) DEVICE — GOWN,NON-REINFORCED,SIRUS,SET IN SLV,XXL: Brand: MEDLINE

## (undated) DEVICE — SYR LUERLOK 30CC

## (undated) DEVICE — PAD WRAP/ON KN COOL/THERP W/ELAS/STRAP LF

## (undated) DEVICE — SYS COLD/THRP POLAR/CARE/CUBE

## (undated) DEVICE — SPNG GZ WOVN 4X4IN 12PLY 10/BX STRL

## (undated) DEVICE — TP SXN YANKR FLANG STRL

## (undated) DEVICE — PK KN ARTHSCP 70

## (undated) DEVICE — TBG PUMP ARTHSCP MAIN AR6400 16FT

## (undated) DEVICE — GLV SURG TRIUMPH MICRO PF LTX 8 STRL

## (undated) DEVICE — BLD CUT FORMLA AGGR PLS 4.0MM

## (undated) DEVICE — HOLDER: Brand: DEROYAL

## (undated) DEVICE — 4-PORT MANIFOLD: Brand: NEPTUNE 2

## (undated) DEVICE — SUT ETHLN 3/0 PS2 18 IN 1669H

## (undated) DEVICE — BNDG ELAS CO-FLEX SLF ADHR 4IN5YD LF STRL